# Patient Record
(demographics unavailable — no encounter records)

---

## 2024-10-29 NOTE — PHYSICAL EXAM
[Right] : right foot and ankle [5___] : plantar flexion 5[unfilled]/5 [2+] : dorsalis pedis pulse: 2+ [] : Sensation present to light touch in all distributions [FreeTextEntry3] : scant bloody drainage medial wound lateral wound well healed -- suture removed, steris applied. medial sutures left in place.

## 2024-10-29 NOTE — HISTORY OF PRESENT ILLNESS
[de-identified] : 01/08/2024: long term ankle pain. has scope debridement >10 years ago. multiple cortisone injections -- last 9/2023. pre dm (a1c 5.6). no tob.   03/05/2024: no improvement. completed PT. using brace  07/09/2024: mild improvement. using natalia brace  9/3/2024: no change since last visit. using natalia brace. would like to discuss surgery  10/18/2024: right foot triple arthrodesis  10/29/2024: no complaints. NWB w/ splint. Patient denies fevers, chills, or drainage. has not been taking asa [] : Post Surgical Visit: no [FreeTextEntry1] : right ankle [de-identified] : CSI [de-identified] : Outside provider [de-identified] : Xray, MRI @ [facility] [de-identified] : Physical Therapy

## 2024-10-29 NOTE — ASSESSMENT
[FreeTextEntry1] : nwb new short leg cast applied elevate tylenol prn reiterated asa for ppx f/up 2 wks w/ foot and calcaneal xray out of cast  With the use of stockinette, webril, and fiberglass casting material, the patient was placed in a non-weight bearing short leg cast.

## 2024-11-13 NOTE — HISTORY OF PRESENT ILLNESS
[de-identified] : 01/08/2024: long term ankle pain. has scope debridement >10 years ago. multiple cortisone injections -- last 9/2023. pre dm (a1c 5.6). no tob.   03/05/2024: no improvement. completed PT. using brace  07/09/2024: mild improvement. using natalia brace  9/3/2024: no change since last visit. using natalia brace. would like to discuss surgery  10/18/2024: right foot triple arthrodesis  10/29/2024: no complaints. NWB w/ splint. Patient denies fevers, chills, or drainage. has not been taking asa  11/13/2024: improving. NWB in cast. Patient denies fevers, chills, or drainage. taking asa.   [] : Post Surgical Visit: no [FreeTextEntry1] : right ankle [de-identified] : CSI [de-identified] : Outside provider [de-identified] : Xray, MRI @ [facility] [de-identified] : Physical Therapy

## 2024-11-13 NOTE — PHYSICAL EXAM
[Right] : right foot and ankle [5___] : plantar flexion 5[unfilled]/5 [2+] : dorsalis pedis pulse: 2+ [] : no calf tenderness

## 2024-11-13 NOTE — ASSESSMENT
[FreeTextEntry1] : nwb new short leg cast applied elevate tylenol prn asa for ppx f/up 2 wks w/ foot and calcaneal xray out of cast  With the use of stockinette, webril, and fiberglass casting material, the patient was placed in a non-weight bearing short leg cast.

## 2024-11-27 NOTE — HISTORY OF PRESENT ILLNESS
[de-identified] : 01/08/2024: long term ankle pain. has scope debridement >10 years ago. multiple cortisone injections -- last 9/2023. pre dm (a1c 5.6). no tob.   03/05/2024: no improvement. completed PT. using brace  07/09/2024: mild improvement. using natalia brace  9/3/2024: no change since last visit. using natalia brace. would like to discuss surgery  10/18/2024: right foot triple arthrodesis  10/29/2024: no complaints. NWB w/ splint. Patient denies fevers, chills, or drainage. has not been taking asa  11/13/2024: improving. NWB in cast. Patient denies fevers, chills, or drainage. taking asa.   11/27/2024:  no complaints.  NWB in cast. Patient denies fevers, chills, or drainage. [] : Post Surgical Visit: no [FreeTextEntry1] : right ankle [de-identified] : CSI [de-identified] : Outside provider [de-identified] : Xray, MRI @ [facility] [de-identified] : Physical Therapy

## 2024-12-11 NOTE — ASSESSMENT
[FreeTextEntry1] : progressive wb back to wbat in boot ice/elevate tylenol prn f/up 3 wks w/ foot and calcaneal xrays

## 2024-12-11 NOTE — HISTORY OF PRESENT ILLNESS
[de-identified] : 01/08/2024: long term ankle pain. has scope debridement >10 years ago. multiple cortisone injections -- last 9/2023. pre dm (a1c 5.6). no tob.   03/05/2024: no improvement. completed PT. using brace  07/09/2024: mild improvement. using natalia brace  9/3/2024: no change since last visit. using natalia brace. would like to discuss surgery  10/18/2024: right foot triple arthrodesis  10/29/2024: no complaints. NWB w/ splint. Patient denies fevers, chills, or drainage. has not been taking asa  11/13/2024: improving. NWB in cast. Patient denies fevers, chills, or drainage. taking asa.   11/27/2024:  no complaints.  NWB in cast. Patient denies fevers, chills, or drainage.  12/11/2024:  no complaints. nwb in boot. Patient denies fevers, chills, or drainage. [] : Post Surgical Visit: no [FreeTextEntry1] : right ankle [de-identified] : CSI [de-identified] : Outside provider [de-identified] : Xray, MRI @ [facility] [de-identified] : Physical Therapy

## 2024-12-31 NOTE — PHYSICAL EXAM
[Right] : right foot and ankle [5___] : plantar flexion 5[unfilled]/5 [2+] : dorsalis pedis pulse: 2+ [] : uses walker

## 2024-12-31 NOTE — HISTORY OF PRESENT ILLNESS
[de-identified] : 01/08/2024: long term ankle pain. has scope debridement >10 years ago. multiple cortisone injections -- last 9/2023. pre dm (a1c 5.6). no tob.   03/05/2024: no improvement. completed PT. using brace  07/09/2024: mild improvement. using natalia brace  9/3/2024: no change since last visit. using natalia brace. would like to discuss surgery  10/18/2024: right foot triple arthrodesis  10/29/2024: no complaints. NWB w/ splint. Patient denies fevers, chills, or drainage. has not been taking asa  11/13/2024: improving. NWB in cast. Patient denies fevers, chills, or drainage. taking asa.   11/27/2024:  no complaints.  NWB in cast. Patient denies fevers, chills, or drainage.  12/11/2024:  no complaints. nwb in boot. Patient denies fevers, chills, or drainage.  12/31/2024: no complaints. walking in boot w/ walker. Patient denies fevers, chills, or drainage. [] : Post Surgical Visit: no [FreeTextEntry1] : right ankle [de-identified] : CSI [de-identified] : Outside provider [de-identified] : Xray, MRI @ [facility] [de-identified] : Physical Therapy

## 2025-01-22 NOTE — ASSESSMENT
[FreeTextEntry1] : wbat ice/elevate declined PT -- will do HEP transition to sneaker f/up 6 wks w/ foot and calcaneal xrays

## 2025-01-22 NOTE — HISTORY OF PRESENT ILLNESS
[de-identified] : 01/08/2024: long term ankle pain. has scope debridement >10 years ago. multiple cortisone injections -- last 9/2023. pre dm (a1c 5.6). no tob.   03/05/2024: no improvement. completed PT. using brace  07/09/2024: mild improvement. using natalia brace  9/3/2024: no change since last visit. using natalia brace. would like to discuss surgery  10/18/2024: right foot triple arthrodesis  10/29/2024: no complaints. NWB w/ splint. Patient denies fevers, chills, or drainage. has not been taking asa  11/13/2024: improving. NWB in cast. Patient denies fevers, chills, or drainage. taking asa.   11/27/2024:  no complaints.  NWB in cast. Patient denies fevers, chills, or drainage.  12/11/2024:  no complaints. nwb in boot. Patient denies fevers, chills, or drainage.  12/31/2024: no complaints. walking in boot w/ walker. Patient denies fevers, chills, or drainage.  01/22/2025:  improving. walking in boot w/ cane. Patient denies fevers, chills, or drainage. [] : Post Surgical Visit: no [FreeTextEntry1] : right ankle [de-identified] : CSI [de-identified] : Outside provider [de-identified] : Xray, MRI @ [facility] [de-identified] : Physical Therapy

## 2025-01-22 NOTE — PHYSICAL EXAM
[Right] : right foot and ankle [5___] : plantar flexion 5[unfilled]/5 [2+] : dorsalis pedis pulse: 2+ [] : ambulation with cane [FreeTextEntry8] : no ttp [TWNoteComboBox7] : dorsiflexion 10 degrees [de-identified] : plantar flexion 30 degrees

## 2025-03-05 NOTE — HISTORY OF PRESENT ILLNESS
[de-identified] : 01/08/2024: long term ankle pain. has scope debridement >10 years ago. multiple cortisone injections -- last 9/2023. pre dm (a1c 5.6). no tob.   03/05/2024: no improvement. completed PT. using brace  07/09/2024: mild improvement. using natalia brace  9/3/2024: no change since last visit. using natalia brace. would like to discuss surgery  10/18/2024: right foot triple arthrodesis  10/29/2024: no complaints. NWB w/ splint. Patient denies fevers, chills, or drainage. has not been taking asa  11/13/2024: improving. NWB in cast. Patient denies fevers, chills, or drainage. taking asa.   11/27/2024:  no complaints.  NWB in cast. Patient denies fevers, chills, or drainage.  12/11/2024:  no complaints. nwb in boot. Patient denies fevers, chills, or drainage.  12/31/2024: no complaints. walking in boot w/ walker. Patient denies fevers, chills, or drainage.  01/22/2025:  improving. walking in boot w/ cane. Patient denies fevers, chills, or drainage.  03/05/2025:  improving. walking in sneakers. Patient denies fevers, chills, or drainage. [] : Post Surgical Visit: no [FreeTextEntry1] : right ankle [de-identified] : CSI [de-identified] : Outside provider [de-identified] : Xray, MRI @ [facility] [de-identified] : Physical Therapy

## 2025-03-05 NOTE — PHYSICAL EXAM
[Right] : right foot and ankle [5___] : plantar flexion 5[unfilled]/5 [2+] : dorsalis pedis pulse: 2+ [] : patient ambulates without assistive device [FreeTextEntry3] : mild lateral swelling [FreeTextEntry8] : no ttp at foot [TWNoteComboBox7] : dorsiflexion 10 degrees [de-identified] : plantar flexion 30 degrees

## 2025-03-05 NOTE — ASSESSMENT
[FreeTextEntry1] : wbat ice/elevate supportive shoe PT inc activity as chanda f/up 3 months w/ foot and calcaneal xray

## 2025-06-04 NOTE — PHYSICAL EXAM
[Right] : right foot and ankle [5___] : plantar flexion 5[unfilled]/5 [2+] : dorsalis pedis pulse: 2+ [] : no calf tenderness [FreeTextEntry8] : no ttp at foot [TWNoteComboBox7] : dorsiflexion 10 degrees [de-identified] : plantar flexion 30 degrees

## 2025-06-04 NOTE — ASSESSMENT
[FreeTextEntry1] : wbat ice/elevate supportive shoe. [employer] PT activity as chanda f/up 4 months w/ foot and calcaneal xray

## 2025-06-04 NOTE — HISTORY OF PRESENT ILLNESS
[de-identified] : 01/08/2024: long term ankle pain. has scope debridement >10 years ago. multiple cortisone injections -- last 9/2023. pre dm (a1c 5.6). no tob.   03/05/2024: no improvement. completed PT. using brace  07/09/2024: mild improvement. using natalia brace  9/3/2024: no change since last visit. using natalia brace. would like to discuss surgery  10/18/2024: right foot triple arthrodesis  10/29/2024: no complaints. NWB w/ splint. Patient denies fevers, chills, or drainage. has not been taking asa  11/13/2024: improving. NWB in cast. Patient denies fevers, chills, or drainage. taking asa.   11/27/2024:  no complaints.  NWB in cast. Patient denies fevers, chills, or drainage.  12/11/2024:  no complaints. nwb in boot. Patient denies fevers, chills, or drainage.  12/31/2024: no complaints. walking in boot w/ walker. Patient denies fevers, chills, or drainage.  01/22/2025:  improving. walking in boot w/ cane. Patient denies fevers, chills, or drainage.  03/05/2025:  improving. walking in sneakers. Patient denies fevers, chills, or drainage.  06/04/2025: improving. walking in regular shoes. would like to continue PT. Patient denies fevers, chills, or drainage. has been increasing activity/walking [] : Post Surgical Visit: no [FreeTextEntry1] : right ankle [de-identified] : CSI [de-identified] : Outside provider [de-identified] : Xray, MRI @ [facility] [de-identified] : Physical Therapy